# Patient Record
Sex: FEMALE | URBAN - METROPOLITAN AREA
[De-identification: names, ages, dates, MRNs, and addresses within clinical notes are randomized per-mention and may not be internally consistent; named-entity substitution may affect disease eponyms.]

---

## 2017-10-26 ENCOUNTER — THERAPY VISIT (OUTPATIENT)
Dept: PHYSICAL THERAPY | Facility: CLINIC | Age: 77
End: 2017-10-26
Payer: MEDICARE

## 2017-10-26 DIAGNOSIS — R53.1 WEAKNESS GENERALIZED: Primary | ICD-10-CM

## 2017-10-26 PROCEDURE — 97110 THERAPEUTIC EXERCISES: CPT | Mod: GP | Performed by: PHYSICAL THERAPIST

## 2017-10-26 PROCEDURE — 97161 PT EVAL LOW COMPLEX 20 MIN: CPT | Mod: GP | Performed by: PHYSICAL THERAPIST

## 2017-10-26 PROCEDURE — G8979 MOBILITY GOAL STATUS: HCPCS | Mod: GP | Performed by: PHYSICAL THERAPIST

## 2017-10-26 PROCEDURE — G8978 MOBILITY CURRENT STATUS: HCPCS | Mod: GP | Performed by: PHYSICAL THERAPIST

## 2017-10-26 NOTE — MR AVS SNAPSHOT
After Visit Summary   10/26/2017    Kassidy Del Valle    MRN: [id number]           Patient Information     Date Of Birth          1940        Visit Information        Provider Department      10/26/2017 9:20 AM Fransisco Villareal, PT [facility]        Today's Diagnoses     Weakness generalized    -  1       Follow-ups after your visit        Your next 10 appointments already scheduled     Nov 01, 2017  8:10 AM CDT   FELIX Extremity with Fransisco Villareal PT   [facility] ([facility]  )    [address]  [place] [address]   [phone]            Nov 03, 2017  8:00 AM CDT   FELIX Extremity with Mera Sanderson PT   [facility] ([facility]  )    [address]  [place] [address]   [phone]            Nov 08, 2017  8:00 AM CST   FELIX Extremity with Mera Sanderson PT   [facility] ([facility]  )    [address]  [place] [address]   [phone]            Nov 10, 2017  8:00 AM CST   FELIX Extremity with Mera Sanderson PT   [facility] ([facility]  )    [address]  [place] [address]   [phone]            Nov 22, 2017  8:10 AM CST   FELIX Extremity with Fransisco Villareal PT   [facility] ([facility]  )    [address]  [place] [address]   [phone]              Who to contact     If you have questions or need follow up information about today's clinic visit or your schedule please contact [facility] PH[facility]ICAL THERAP[facility] directly at [phone].  Normal or non-critical lab and imaging results will be  "communicated to you by MyChart, letter or phone within 4 business days after the clinic has received the results. If you do not hear from us within 7 days, please contact the clinic through Anonymous You or phone. If you have a critical or abnormal lab result, we will notify you by phone as soon as possible.  Submit refill requests through Anonymous You or call your pharmacy and they will forward the refill request to us. Please allow 3 business days for your refill to be completed.          Additional Information About Your Visit        Anonymous You Information     Anonymous You lets you send messages to your doctor, view your test results, renew your prescriptions, schedule appointments and more. To sign up, go to [email or web address] You . Click on \"Log in\" on the left side of the screen, which will take you to the Welcome page. Then click on \"Sign up Now\" on the right side of the page.     You will be asked to enter the access code listed below, as well as some personal information. Please follow the directions to create your username and password.     Your access code is: [email or web address]  Expires: 2018 11:15 AM     Your access code will  in 90 days. If you need help or a new code, please call your [facility] clinic or [phone].        Care EveryWhere ID     This is your Care EveryWhere ID. This could be used by other organizations to access your [facility] medical records  [id number]         Blood Pressure from Last 3 Encounters:   No data found for BP    Weight from Last 3 Encounters:   No data found for Wt              We Performed the Following     HC PT EVAL, LOW COMPLEXITY     FELIX INITIAL EVAL REPORT     THERAPEUTIC EXERCISES        Primary Care Provider Office Phone # Fax #    Berry Hope [phone]       [facility] [address] [address]        Equal Access to Services     DANIEL MCGRATH AH: Hadii raissa Gillette, idris carrillo, qashahrzadta alejandro cade " eemly singeralinayessenia gutierrezSavannaaawaldo ah. So [facility] [phone].    ATENCIÓN: Si habla phi, tiene a salvador disposición servicios gratuitos de asistencia lingüística. Earnest al [phone].    We comply with applicable federal civil rights laws and [place] laws. We do not discriminate on the basis of race, color, national origin, age, disability, sex, sexual orientation, or gender identity.            Thank you!     Thank you for choosing [facility] PH[facility] THERAP[facility]  for your care. Our goal is always to provide you with excellent care. Hearing back from our patients is one way we can continue to improve our services. Please take a few minutes to complete the written survey that you may receive in the mail after your visit with us. Thank you!             Your Updated Medication List - Protect others around you: Learn how to safely use, store and throw away your medicines at www.disposemymeds.org.      Notice  As of 10/26/2017 11:15 AM    You have not been prescribed any medications.

## 2017-10-26 NOTE — LETTER
[facility]  [address]  [place] [address]  [phone]    2017    Re: Kassidy Del Valle   :   1940  MRN:  [id number]   REFERRING PHYSICIAN:   Berry Hope    [facility]    Date of Initial Evaluation:  10/26/2017  Visits:  Rxs Used: 1  Reason for Referral:  Weakness generalized    EVALUATION SUMMARY    Subjective:    Patient is a 77 year old female presenting with rehab right hip hpi.   Kassidy Del Valle is a 77 year old female with a bilateral hips condition.  Condition occurred with:  Other reason.  Condition occurred: other.  This is a new condition  Pt presents to PT with complaints of generalized weakness and deconditioning.  She came down with pneumonia and was hospitalized for a week recently.  Due to her illness and lack of activity she has gotten weak.  She has plans to travel on 2 occasions in the next 4-6 weeks and wants to get stronger.  She also has noticed that her balance is not as good as it used to be and wants to improve in this area to prevent falls.  At this time she fatigues with walking more than 5-6 blocks.  She also has difficulty climbing 1 flight of stairs.  Pt notes that she has had heart issues and is being followed by a cardiologist.  Pt also had compression fractures in her low back.  She reports that she does not have any pain at this time in her low back..    Site of Pain: NA.        Associated symptoms:  Loss of strength.   Symptoms are exacerbated by standing, walking, ascending stairs and descending stairs and relieved by rest.  Since onset symptoms are gradually improving.        General health as reported by patient is good.  Pertinent medical history includes:  High blood pressure, heart problems, history of fractures, overweight, anemia and sleep disorder/apnea.  Medical allergies: no.  Other surgeries include:  None reported.   Current medications:  Cardiac medication, meds to increase bone density and high blood pressure medication.  Current occupation is Retired.      Barriers include:  None as reported by the patient.  Red flags:  None as reported by the patient.  General Evaluation:  AROM:  Arom wnl general: Upper extremity and lower extremity ROM is grossly WNL.  Mild limitation noted in both cervical and lumbar ROM (flexion and extension)  Gross Strength:    Upper Extremity:   Significant findings: UE strength 4+/5  Lower Extremity:   Significant findings:  LE strength 4+/5  Balance:  Balance wnl general: Poor single leg balance; SLS <5 seconds.  Posture:  normal  Gait:  normal  Assessment/Plan:    Patient is a 77 year old female with deconditioning complaints.    Patient has the following significant findings with corresponding treatment plan.                Diagnosis 1:  Deconditioning  Decreased strength - therapeutic exercise and therapeutic activities  Impaired balance - neuro re-education and therapeutic activities  Decreased proprioception - neuro re-education and therapeutic activities  Decreased function - therapeutic activities  Therapy Evaluation Codes:   1) History comprised of:   Personal factors that impact the plan of care:      None.    Comorbidity factors that impact the plan of care are:      None.     Medications impacting care: None.  2) Examination of Body Systems comprised of:   Body structures and functions that impact the plan of care:      Cervical spine, Hip, Knee, Lumbar spine and Shoulder.   Activity limitations that impact the plan of care are:      Stairs, Standing and Walking.  3) Clinical presentation characteristics are:   Stable/Uncomplicated.  4) Decision-Making      Cumulative Therapy Evaluation is: Low complexity.  Previous and current functional limitations:  (See Goal Flow Sheet for this information)    Short term and Long term goals: (See Goal Flow Sheet for this information)   Communication  ability:  Patient appears to be able to clearly communicate and understand verbal and written communication and follow directions correctly.  Treatment Explanation - The following has been discussed with the patient:   RX ordered/plan of care  Anticipated outcomes  Possible risks and side effects  This patient would benefit from PT intervention to resume normal activities.   Rehab potential is good.  Frequency:  2 X week, once daily  Duration:  for 3 weeks  Discharge Plan:  Achieve all LTG.  Independent in home treatment program.  Reach maximal therapeutic benefit.              Thank you for your referral.    INQUIRIES  Therapist: Fransisco Villareal, [facility]  [facility]ER[facility]  [address]  [place] [address]  Phone: [phone]  Fax: [phone]

## 2017-10-26 NOTE — LETTER
DEPARTMENT OF HEALTH AND HUMAN SERVICES  CENTERS FOR MEDICARE & MEDICAID SERVICES    PLAN/UPDATED PLAN OF PROGRESS FOR OUTPATIENT REHABILITATION    PATIENTS NAME:  Kassidy Del Valle   : 1940  PROVIDER NUMBER:    [id number]  [id number]   PROVIDER NAME: [facility] PH[facility]ER[facility]  MEDICAL RECORD NUMBER: [id number]   START OF CARE DATE:  SOC Date: 10/26/17   TYPE:  PT  PRIMARY/TREATMENT DIAGNOSIS: (Pertinent Medical Diagnosis)  Weakness generalized    VISITS FROM START OF CARE:  Rxs Used: 1     Subjective:    Patient is a 77 year old female presenting with rehab right hip hpi.   Kassidy Del Valle is a 77 year old female with a bilateral hips condition.  Condition occurred with:  Other reason.  Condition occurred: other.  This is a new condition  Pt presents to PT with complaints of generalized weakness and deconditioning.  MD order date is 10-.  She came down with pneumonia and was hospitalized for a week recently.  Due to her illness and lack of activity she has gotten weak.  She has plans to travel on 2 occasions in the next 4-6 weeks and wants to get stronger.  She also has noticed that her balance is not as good as it used to be and wants to improve in this area to prevent falls.  At this time she fatigues with walking more than 5-6 blocks.  She also has difficulty climbing 1 flight of stairs.  Pt notes that she has had heart issues and is being followed by a cardiologist.  Pt also had compression fractures in her low back.  She reports that she does not have any pain at this time in her low back..    Site of Pain: NA.        Associated symptoms:  Loss of strength.   Symptoms are exacerbated by standing, walking, ascending stairs and descending stairs and relieved by rest.  Since onset symptoms are gradually improving.        General health as reported by patient is good.  Pertinent medical history includes:  High blood pressure, heart problems, history of  fractures, overweight, anemia and sleep disorder/apnea.  Medical allergies: no.  Other surgeries include:  None reported.  Current medications:  Cardiac medication, meds to increase bone density and high blood pressure medication.  Current occupation is Retired.      Barriers include:  None as reported by the patient.  Red flags:  None as reported by the patient.      General Evaluation:  AROM:  Arom wnl general: Upper extremity and lower extremity ROM is grossly WNL.  Mild limitation noted in both cervical and lumbar ROM (flexion and extension)  Gross Strength:    Upper Extremity:   Significant findings: UE strength 4+/5  Lower Extremity:   Significant findings:  LE strength 4+/5  Balance:  Balance wnl general: Poor single leg balance; SLS <5 seconds.  Posture:  normal  Gait:  normal    Assessment/Plan:    Patient is a 77 year old female with deconditioning complaints.    Patient has the following significant findings with corresponding treatment plan.                Diagnosis 1:  Deconditioning  Decreased strength - therapeutic exercise and therapeutic activities  Impaired balance - neuro re-education and therapeutic activities  Decreased proprioception - neuro re-education and therapeutic activities  Decreased function - therapeutic activities                      Therapy Evaluation Codes:   1) History comprised of:   Personal factors that impact the plan of care:      None.    Comorbidity factors that impact the plan of care are:      None.     Medications impacting care: None.  2) Examination of Body Systems comprised of:   Body structures and functions that impact the plan of care:      Cervical spine, Hip, Knee, Lumbar spine and Shoulder.   Activity limitations that impact the plan of care are:      Stairs, Standing and Walking.  3) Clinical presentation characteristics are:   Stable/Uncomplicated.  4) Decision-Making      Cumulative Therapy Evaluation is: Low complexity.  Previous and current functional  "limitations:  (See Goal Flow Sheet for this information)    Short term and Long term goals: (See Goal Flow Sheet for this information)   Communication ability:  Patient appears to be able to clearly communicate and understand verbal and written communication and follow directions correctly.  Treatment Explanation - The following has been discussed with the patient:   RX ordered/plan of care  Anticipated outcomes  Possible risks and side effects  This patient would benefit from PT intervention to resume normal activities.   Rehab potential is good.  Frequency:  2 X week, once daily  Duration:  for 3 weeks  Discharge Plan:  Achieve all LTG.  Independent in home treatment program.  Reach maximal therapeutic benefit.              Caregiver Signature/Credentials _____________________________ Date ________      Treating Provider: BLANKA Garcia   I have reviewed and certified the need for these services and plan of treatment while under my care.        PHYSICIAN'S SIGNATURE:   _________________________________________  Date___________    Berry Hope MD    Certification period:  Beginning of Cert date period: 10/26/17 to  End of Cert period date: 01/24/18     Functional Level Progress Report: Please see attached \"Goal Flow sheet for Functional level.\"    ____X____ Continue Services or       ________ DC Services                Service dates: From  SOC Date: 10/26/17 date to present                         "

## 2017-10-26 NOTE — PROGRESS NOTES
Subjective:    Patient is a 77 year old female presenting with rehab right hip hpi.   Kassidy Del Valle is a 77 year old female with a bilateral hips condition.  Condition occurred with:  Other reason.  Condition occurred: other.  This is a new condition  Pt presents to PT with complaints of generalized weakness and deconditioning.  MD order date is 10-.  She came down with pneumonia and was hospitalized for a week recently.  Due to her illness and lack of activity she has gotten weak.  She has plans to travel on 2 occasions in the next 4-6 weeks and wants to get stronger.  She also has noticed that her balance is not as good as it used to be and wants to improve in this area to prevent falls.  At this time she fatigues with walking more than 5-6 blocks.  She also has difficulty climbing 1 flight of stairs.  Pt notes that she has had heart issues and is being followed by a cardiologist.  Pt also had compression fractures in her low back.  She reports that she does not have any pain at this time in her low back..    Site of Pain: NA.        Associated symptoms:  Loss of strength.   Symptoms are exacerbated by standing, walking, ascending stairs and descending stairs and relieved by rest.  Since onset symptoms are gradually improving.        General health as reported by patient is good.  Pertinent medical history includes:  High blood pressure, heart problems, history of fractures, overweight, anemia and sleep disorder/apnea.  Medical allergies: no.  Other surgeries include:  None reported.  Current medications:  Cardiac medication, meds to increase bone density and high blood pressure medication.  Current occupation is Retired.        Barriers include:  None as reported by the patient.    Red flags:  None as reported by the patient.                        Objective:    System    Physical Exam        General Evaluation:  AROM:  Arom wnl general: Upper extremity and lower extremity ROM is grossly WNL.  Mild  limitation noted in both cervical and lumbar ROM (flexion and extension)              Gross Strength:            Upper Extremity:   Significant findings: UE strength 4+/5  Lower Extremity:   Significant findings:  LE strength 4+/5                  Balance:  Balance wnl general: Poor single leg balance; SLS <5 seconds.            Posture:  normal          Gait:  normal                                         ROS    Assessment/Plan:      Patient is a 77 year old female with deconditioning complaints.    Patient has the following significant findings with corresponding treatment plan.                Diagnosis 1:  Deconditioning    Decreased strength - therapeutic exercise and therapeutic activities  Impaired balance - neuro re-education and therapeutic activities  Decreased proprioception - neuro re-education and therapeutic activities  Decreased function - therapeutic activities    Therapy Evaluation Codes:   1) History comprised of:   Personal factors that impact the plan of care:      None.    Comorbidity factors that impact the plan of care are:      None.     Medications impacting care: None.  2) Examination of Body Systems comprised of:   Body structures and functions that impact the plan of care:      Cervical spine, Hip, Knee, Lumbar spine and Shoulder.   Activity limitations that impact the plan of care are:      Stairs, Standing and Walking.  3) Clinical presentation characteristics are:   Stable/Uncomplicated.  4) Decision-Making      Cumulative Therapy Evaluation is: Low complexity.    Previous and current functional limitations:  (See Goal Flow Sheet for this information)    Short term and Long term goals: (See Goal Flow Sheet for this information)     Communication ability:  Patient appears to be able to clearly communicate and understand verbal and written communication and follow directions correctly.  Treatment Explanation - The following has been discussed with the patient:   RX ordered/plan of  care  Anticipated outcomes  Possible risks and side effects  This patient would benefit from PT intervention to resume normal activities.   Rehab potential is good.    Frequency:  2 X week, once daily  Duration:  for 3 weeks  Discharge Plan:  Achieve all LTG.  Independent in home treatment program.  Reach maximal therapeutic benefit.    Please refer to the daily flowsheet for treatment today, total treatment time and time spent performing 1:1 timed codes.

## 2017-11-03 ENCOUNTER — THERAPY VISIT (OUTPATIENT)
Dept: PHYSICAL THERAPY | Facility: CLINIC | Age: 77
End: 2017-11-03
Payer: MEDICARE

## 2017-11-03 DIAGNOSIS — R53.1 WEAKNESS GENERALIZED: ICD-10-CM

## 2017-11-03 PROCEDURE — 97112 NEUROMUSCULAR REEDUCATION: CPT | Mod: GP | Performed by: PHYSICAL THERAPIST

## 2017-11-03 PROCEDURE — 97110 THERAPEUTIC EXERCISES: CPT | Mod: GP | Performed by: PHYSICAL THERAPIST

## 2017-11-03 NOTE — MR AVS SNAPSHOT
After Visit Summary   11/3/2017    Kassidy Del Valle    MRN: [id number]           Patient Information     Date Of Birth          1940        Visit Information        Provider Department      11/3/2017 8:00 AM Mera Sanderson, PT [facility]        Today's Diagnoses     Weakness generalized           Follow-ups after your visit        Your next 10 appointments already scheduled     Nov 08, 2017  8:00 AM CST   FELIX Extremity with Mera Sanderson, PT   [facility] ([facility]  )    [address]  [place] [address]   [phone]            Nov 10, 2017  8:00 AM CST   FELIX Extremity with Mera Sanderson PT   [facility] ([facility]  )    [address]  [place] [address]   [phone]            Nov 22, 2017  8:10 AM CST   FELIX Extremity with Fransisco Villareal PT   [facility] ([facility]  )    [address]  [place] [address]   [phone]              Who to contact     If you have questions or need follow up information about today's clinic visit or your schedule please contact [facility] directly at [phone].  Normal or non-critical lab and imaging results will be communicated to you by MyChart, letter or phone within 4 business days after the clinic has received the results. If you do not hear from us within 7 days, please contact the clinic through MyChart or phone. If you have a critical or abnormal lab result, we will notify you by phone as soon as possible.  Submit refill requests through [employer] or call your pharmacy and they will forward the refill request to us. Please allow 3 business days for your refill to be completed.           "Additional Information About Your Visit        MyChart Information     [employer] lets you send messages to your doctor, view your test results, renew your prescriptions, schedule appointments and more. To sign up, go to [email or web address] . Click on \"Log in\" on the left side of the screen, which will take you to the Welcome page. Then click on \"Sign up Now\" on the right side of the page.     You will be asked to enter the access code listed below, as well as some personal information. Please follow the directions to create your username and password.     Your access code is: [email or web address]  Expires: 2018 11:15 AM     Your access code will  in 90 days. If you need help or a new code, please call your [place] clinic or [phone].        Care EveryWhere ID     This is your Care EveryWhere ID. This could be used by other organizations to access your [place] medical records  [id number]         Blood Pressure from Last 3 Encounters:   No data found for BP    Weight from Last 3 Encounters:   No data found for Wt              We Performed the Following     NEUROMUSCULAR RE-EDUCATION     THERAPEUTIC EXERCISES        Primary Care Provider Office Phone # Fax #    [place] [phone]       [address] [address]        Equal Access to Services     [facility] : Hadii raissa ku hadasho Soblanca, waaxda luqadaha, qaybta kaalmada adeegyada, alejandro lux . So [facility] [phone].    ATENCIÓN: Si habla español, tiene a salvador disposición servicios gratuitos de asistencia lingüística. Llame al [phone].    We comply with applicable federal civil rights laws and [place] laws. We do not discriminate on the basis of race, color, national origin, age, disability, sex, sexual orientation, or gender identity.            Thank you!     Thank you for choosing [facility]  for your care. Our " goal is always to provide you with excellent care. Hearing back from our patients is one way we can continue to improve our services. Please take a few minutes to complete the written survey that you may receive in the mail after your visit with us. Thank you!             Your Updated Medication List - Protect others around you: Learn how to safely use, store and throw away your medicines at www.disposemymeds.org.      Notice  As of 11/3/2017  9:40 AM    You have not been prescribed any medications.

## 2017-11-08 ENCOUNTER — THERAPY VISIT (OUTPATIENT)
Dept: PHYSICAL THERAPY | Facility: CLINIC | Age: 77
End: 2017-11-08
Payer: MEDICARE

## 2017-11-08 DIAGNOSIS — R53.1 WEAKNESS GENERALIZED: ICD-10-CM

## 2017-11-08 PROCEDURE — 97112 NEUROMUSCULAR REEDUCATION: CPT | Mod: GP | Performed by: PHYSICAL THERAPIST

## 2017-11-08 PROCEDURE — 97110 THERAPEUTIC EXERCISES: CPT | Mod: GP | Performed by: PHYSICAL THERAPIST

## 2017-11-08 NOTE — PROGRESS NOTES
Subjective:    HPI                    Objective:    System    Physical Exam    General     ROS    Assessment/Plan:      SUBJECTIVE  Subjective changes as noted by pt:   pt stated she is doing good today; Has had no issues with HEP; pt still feels her balance is poor   Current pain level: no pain  Changes in function:  None     Adverse reaction to treatment or activity:  None    OBJECTIVE  Changes in objective findings:  Review of HEP; Progressed Balance exercises.        ASSESSMENT  Kassidy continues to require intervention to meet STG and LTG's: PT  Patient is progressing as expected.  Response to therapy has shown an improvement in  strength and function  Progress made towards STG/LTG?  Yes, minimal changes    PLAN  Continue current treatment plan until patient demonstrates readiness to progress to higher level exercises.    PTA/ATC plan:  N/A    Please refer to the daily flowsheet for treatment today, total treatment time and time spent performing 1:1 timed codes.

## 2017-11-08 NOTE — MR AVS SNAPSHOT
After Visit Summary   11/8/2017    Kassidy Del Valle    MRN: [id number]           Patient Information     Date Of Birth          1940        Visit Information        Provider Department      11/8/2017 8:00 AM Mera Sanderson, PT [facility]        Today's Diagnoses     Weakness generalized           Follow-ups after your visit        Your next 10 appointments already scheduled     Nov 10, 2017  8:00 AM CST   FELIX Extremity with Mera Sanderson, PT   [facility] ([facility]  )    [address]  [place] [address]   [phone]            Nov 22, 2017  8:10 AM CST   FELIX Extremity with Fransisco Villareal, PT   [facility] ([facility]  )    [address]  [place] [address]   [phone]              Who to contact     If you have questions or need follow up information about today's clinic visit or your schedule please contact [facility] directly at [phone].  Normal or non-critical lab and imaging results will be communicated to you by MyChart, letter or phone within 4 business days after the clinic has received the results. If you do not hear from us within 7 days, please contact the clinic through MyChart or phone. If you have a critical or abnormal lab result, we will notify you by phone as soon as possible.  Submit refill requests through [employer] or call your pharmacy and they will forward the refill request to us. Please allow 3 business days for your refill to be completed.          Additional Information About Your Visit        MyChart Information     [employer] lets you send messages to your doctor, view your test results, renew your prescriptions, schedule appointments and more. To sign up, go to [email or web address] .  "Click on \"Log in\" on the left side of the screen, which will take you to the Welcome page. Then click on \"Sign up Now\" on the right side of the page.     You will be asked to enter the access code listed below, as well as some personal information. Please follow the directions to create your username and password.     Your access code is: [email or web address]  Expires: 2018 10:15 AM     Your access code will  in 90 days. If you need help or a new code, please call your [facility] or [phone].        Care EveryWhere ID     This is your Care EveryWhere ID. This could be used by other organizations to access your [place] medical records  [id number]         Blood Pressure from Last 3 Encounters:   No data found for BP    Weight from Last 3 Encounters:   No data found for Wt              We Performed the Following     NEUROMUSCULAR RE-EDUCATION     THERAPEUTIC EXERCISES        Primary Care Provider Office Phone # Fax #    [place] [phone]       [address] [address]        Equal Access to Services     [facility]: Hadii aad ku hadasho Soblanca, waaxda luqadaha, qaybta kaalmanai marinelli, alejandro lux . So [facility] [phone].    ATENCIÓN: Si habla español, tiene a salvador disposición servicios gratuitos de asistencia lingüística. Earnest al [phone].    We comply with applicable federal civil rights laws and [place] laws. We do not discriminate on the basis of race, color, national origin, age, disability, sex, sexual orientation, or gender identity.            Thank you!     Thank you for choosing [facility]  for your care. Our goal is always to provide you with excellent care. Hearing back from our patients is one way we can continue to improve our services. Please take a few minutes to complete the written survey that you may receive in the mail after your visit with " us. Thank you!             Your Updated Medication List - Protect others around you: Learn how to safely use, store and throw away your medicines at www.disposemymeds.org.      Notice  As of 11/8/2017  3:05 PM    You have not been prescribed any medications.

## 2017-11-10 ENCOUNTER — THERAPY VISIT (OUTPATIENT)
Dept: PHYSICAL THERAPY | Facility: CLINIC | Age: 77
End: 2017-11-10
Payer: MEDICARE

## 2017-11-10 DIAGNOSIS — R53.1 WEAKNESS GENERALIZED: ICD-10-CM

## 2017-11-10 PROCEDURE — 97112 NEUROMUSCULAR REEDUCATION: CPT | Mod: GP | Performed by: PHYSICAL THERAPIST

## 2017-11-10 PROCEDURE — 97110 THERAPEUTIC EXERCISES: CPT | Mod: GP | Performed by: PHYSICAL THERAPIST

## 2017-11-22 ENCOUNTER — THERAPY VISIT (OUTPATIENT)
Dept: PHYSICAL THERAPY | Facility: CLINIC | Age: 77
End: 2017-11-22
Payer: MEDICARE

## 2017-11-22 DIAGNOSIS — R53.1 WEAKNESS GENERALIZED: ICD-10-CM

## 2017-11-22 PROCEDURE — 97110 THERAPEUTIC EXERCISES: CPT | Mod: GP | Performed by: PHYSICAL THERAPIST

## 2017-11-22 PROCEDURE — 97112 NEUROMUSCULAR REEDUCATION: CPT | Mod: GP | Performed by: PHYSICAL THERAPIST

## 2017-11-22 NOTE — MR AVS SNAPSHOT
"              After Visit Summary   11/22/2017    Kassidy Del Valle    MRN: [id number]           Patient Information     Date Of Birth          1940        Visit Information        Provider Department      11/22/2017 8:10 AM Fransisco Villareal, PT [facility]        Today's Diagnoses     Weakness generalized           Follow-ups after your visit        Your next 10 appointments already scheduled     Nov 29, 2017  8:10 AM CST   FELIX Extremity with Fransisco Villareal PT   [facility]    [address]  [place] [id number] [phone]              Who to contact     If you have questions or need follow up information about today's clinic visit or your schedule please contact [facility] directly at [phone].  Normal or non-critical lab and imaging results will be communicated to you by [employer], letter or phone within 4 business days after the clinic has received the results. If you do not hear from us within 7 days, please contact the clinic through [employer] or phone. If you have a critical or abnormal lab result, we will notify you by phone as soon as possible.  Submit refill requests through [employer] or call your pharmacy and they will forward the refill request to us. Please allow 3 business days for your refill to be completed.          Additional Information About Your Visit        Post[employer]hart Information     [employer] lets you send messages to your doctor, view your test results, renew your prescriptions, schedule appointments and more. To sign up, go to [email or web address] . Click on \"Log in\" on the left side of the screen, which will take you to the Welcome page. Then click on \"Sign up Now\" on the right side of the page.     You will be asked to enter the access code listed below, as well as some personal " information. Please follow the directions to create your username and password.     Your access code is: [email or web address]  Expires: 2018 10:15 AM     Your access code will  in 90 days. If you need help or a new code, please call your [facility] or [phone].        Care EveryWhere ID     This is your Care EveryWhere ID. This could be used by other organizations to access your [place] medical records  [id number]         Blood Pressure from Last 3 Encounters:   No data found for BP    Weight from Last 3 Encounters:   No data found for Wt              We Performed the Following     NEUROMUSCULAR RE-EDUCATION     THERAPEUTIC EXERCISES        Primary Care Provider Office Phone # Fax #    [place] [phone]       [address] [address]        Equal Access to Services     [facility] RAMILA : Hadii aad una hadasho Soblanca, waaxda luqadaha, qaybta kaalmada adeegyada, alejandro lux . So [facility] [phone].    ATENCIÓN: Si habla español, tiene a salvador disposición servicios gratuitos de asistencia lingüística. Earnest al [phone].    We comply with applicable federal civil rights laws and [place] laws. We do not discriminate on the basis of race, color, national origin, age, disability, sex, sexual orientation, or gender identity.            Thank you!     Thank you for [facility]  for your care. Our goal is always to provide you with excellent care. Hearing back from our patients is one way we can continue to improve our services. Please take a few minutes to complete the written survey that you may receive in the mail after your visit with us. Thank you!             Your Updated Medication List - Protect others around you: Learn how to safely use, store and throw away your medicines at www.disposemymeds.org.      Notice  As of 2017  9:59 AM    You have not been  prescribed any medications.
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
## 2018-01-29 PROBLEM — R53.1 WEAKNESS GENERALIZED: Status: RESOLVED | Noted: 2017-10-26 | Resolved: 2018-01-29
